# Patient Record
Sex: FEMALE | Race: AMERICAN INDIAN OR ALASKA NATIVE
[De-identification: names, ages, dates, MRNs, and addresses within clinical notes are randomized per-mention and may not be internally consistent; named-entity substitution may affect disease eponyms.]

---

## 2020-01-03 ENCOUNTER — HOSPITAL ENCOUNTER (EMERGENCY)
Dept: HOSPITAL 5 - ED | Age: 75
LOS: 1 days | Discharge: HOME | End: 2020-01-04
Payer: MEDICARE
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
## 2020-01-03 DIAGNOSIS — S20.212A: Primary | ICD-10-CM
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
## 2020-01-03 PROCEDURE — 99283 EMERGENCY DEPT VISIT LOW MDM: CPT

## 2020-01-03 PROCEDURE — 71111 X-RAY EXAM RIBS/CHEST4/> VWS: CPT

## 2020-01-03 NOTE — XRAY REPORT
BILATERAL RIBS 4 VIEWS



INDICATION / CLINICAL INFORMATION:

fall/pain.



COMPARISON: 

2/24/2015 chest radiograph



FINDINGS:



RIBS: No acute, displaced fracture or other acute abnormality.



LUNGS: No acute airspace disease, pleural fluid or pneumothorax. Hyperinflated lungs with flattened d
iaphragm suggesting underlying emphysema. Normal heart size.







Signer Name: Israel Castañeda MD 

Signed: 1/3/2020 11:34 PM

 Workstation Name: [id number]

## 2020-01-03 NOTE — EVENT NOTE
ED Screening Note


Date of service: 01/03/20


Time: 22:43


ED Screening Note: 


 Fell of  the bath tub today cc of left sided rid/side/back pain








This initial assessment/diagnostic orders/clinical plan/treatment(s) is/are 

subject to change based on patients health status, clinical progression and re-

assessment by fellow clinical providers in the ED. Further treatment and workup 

at subsequent clinical providers discretion. Patient/guardian urged not to elope

from the ED as their condition may be serious if not clinically assessed and 

managed. 





Initial orders include: 


xr rib


main side eval

## 2020-01-04 VITALS — DIASTOLIC BLOOD PRESSURE: 71 MMHG | SYSTOLIC BLOOD PRESSURE: 130 MMHG

## 2020-01-04 NOTE — EMERGENCY DEPARTMENT REPORT
ED Fall HPI





- General


Chief Complaint: Fall


Stated Complaint: FELL, SIDE PAIN


Time Seen by Provider: 20 00:35


Source: patient, family


Mode of arrival: Ambulatory





- History of Present Illness


Initial Comments: 





Patient is a 74-year-old  female with past medical history of COPD who 

suffered a fall today at home.  Patient states because of the whether she lost 

power in her home while in the bathroom.  Patient states while trying to get out

of the bathroom she fell between the toe and the commode.  Patient struck her 

left chest on the bathtub.  Patient has 10 out of 10 pain.  Pain is worse with 

deep breathing and with movement.  Patient denies head injury.  Patient states 

she does have some mild shortness of breath.





- Related Data


                                  Previous Rx's











 Medication  Instructions  Recorded  Last Taken  Type


 


ALBUTEROL Inhaler (OR & NICU) 2 puff IH QID PRN #1 inhalation 02/25/15 Unknown 

Rx





[Proair]    


 


HYDROcodone/APAP 5-325 [[place] 1 each PO Q6HR PRN #14 tablet 20 Unknown Rx





5/325]    











                                    Allergies











Allergy/AdvReac Type Severity Reaction Status Date / Time


 


No Known Allergies Allergy   Verified 02/24/15 14:56














ED Review of Systems


ROS: 


Stated complaint: FELL, SIDE PAIN


Other details as noted in HPI





Comment: All other systems reviewed and negative





ED Past Medical Hx





- Past Medical History


Previous Medical History?: Yes


Hx Asthma: Yes


Additional medical history: bronchitis





- Surgical History


Past Surgical History?: Yes


Additional Surgical History: TUBAL LIGATION





- Social History


Smoking Status: Current Every Day Smoker


Substance Use Type: None





- Medications


Home Medications: 


                                Home Medications











 Medication  Instructions  Recorded  Confirmed  Last Taken  Type


 


ALBUTEROL Inhaler (OR & NICU) 2 puff IH QID PRN #1 inhalation 02/25/15 08/15/15 

Unknown Rx





[Proair]     


 


HYDROcodone/APAP 5-325 [[place] 1 each PO Q6HR PRN #14 tablet 20  Unknown Rx





5/325]     














ED Physical Exam





- General


Limitations: Physical Limitation


General appearance: alert, in distress (secondary to pain)





- Head


Head exam: Present: atraumatic, normocephalic





- Eye


Eye exam: Present: normal appearance





- ENT


ENT exam: Present: mucous membranes moist





- Neck


Neck exam: Present: normal inspection





- Respiratory


Respiratory exam: Present: normal lung sounds bilaterally, chest wall tenderness

(left lower rib pain laterally).  Absent: respiratory distress, wheezes, rales, 

rhonchi





- Cardiovascular


Cardiovascular Exam: Present: normal rhythm, tachycardia (mild), normal heart 

sounds.  Absent: systolic murmur, diastolic murmur, rubs, gallop





- GI/Abdominal


GI/Abdominal exam: Present: soft, normal bowel sounds.  Absent: distended, 

tenderness





- Extremities Exam


Extremities exam: Present: normal inspection





- Back Exam


Back exam: Present: normal inspection





- Neurological Exam


Neurological exam: Present: alert, oriented X3





- Psychiatric


Psychiatric exam: Present: normal affect, normal mood





- Skin


Skin exam: Present: warm, dry, intact, normal color.  Absent: rash





ED Course





                                   Vital Signs











  20





  22:39


 


Temperature 98.7 F


 


Pulse Rate 107 H


 


Respiratory 18





Rate 


 


Blood Pressure 99/57


 


O2 Sat by Pulse 93





Oximetry 














ED Medical Decision Making





- Radiology Data








 Patient: DAGOBERTO HERNANDEZ MR#: [id number] 


 [id number] 


 : 1945 Acct:[id number] 





 Age/Sex: 74 / F ADM Date: 20 





 Loc: ED 


 Attending Dr: 








 Ordering Physician: SORAYA MOSES 


 Date of Service: 20 


 Procedure(s): XR ribs BILAT w/PA chest 4+V 


 Accession Number(s): [id number] 





 cc: SORAYA MOSES 





 Fluoro Time In Minutes: 





 BILATERAL RIBS 4 VIEWS 





 INDICATION / CLINICAL INFORMATION: 


 fall/pain. 





 COMPARISON: 


 2015 chest radiograph 





 FINDINGS: 





 RIBS: No acute, displaced fracture or other acute abnormality. 





 LUNGS: No acute airspace disease, pleural fluid or pneumothorax. Hyperinflated 

lungs with flattened


 diaphragm suggesting underlying emphysema. Normal heart size. 











 Signer Name: Israel Castañeda MD 


 Signed: 1/3/2020 11:34 PM 


 Workstation Name: [id number] 








 Transcribed By: DMB 


 Dictated By: Israel Castañeda MD 


 Electronically Authenticated By: Israel Castañeda MD 


 Signed Date/Time: 20 2334 





- Medical Decision Making





No obvious displaced rib fracture was seen on x-ray.  Patient be given incentive

spirometer and also medication for symptomatic relief and should be discharged 

home.


Critical care attestation.: 


If time is entered above; I have spent that time in minutes in the direct care 

of this critically ill patient, excluding procedure time.








ED Disposition


Clinical Impression: 


Rib contusion


Qualifiers:


 Encounter type: initial encounter Laterality: left Qualified Code(s): S20.212A 

- Contusion of left front wall of thorax, initial encounter





Disposition: DC-01 TO HOME OR SELFCARE


Is pt being admited?: No


Does the pt Need Aspirin: No


Condition: Stable


Instructions:  Rib Fracture (ED)


Additional Instructions: 


Information for rib fractures has been given.  The treatment for rib contusions 

and rib fractures on the same.


Time of Disposition: 00:49